# Patient Record
Sex: MALE | Race: WHITE | NOT HISPANIC OR LATINO | Employment: OTHER | URBAN - METROPOLITAN AREA
[De-identification: names, ages, dates, MRNs, and addresses within clinical notes are randomized per-mention and may not be internally consistent; named-entity substitution may affect disease eponyms.]

---

## 2017-01-01 ENCOUNTER — NURSE TRIAGE (OUTPATIENT)
Dept: ADMINISTRATIVE | Facility: CLINIC | Age: 37
End: 2017-01-01

## 2017-01-01 ENCOUNTER — HOSPITAL ENCOUNTER (EMERGENCY)
Facility: HOSPITAL | Age: 37
Discharge: HOME | End: 2017-04-27
Attending: EMERGENCY MEDICINE
Payer: MEDICARE

## 2017-01-01 ENCOUNTER — HOSPITAL ENCOUNTER (EMERGENCY)
Facility: HOSPITAL | Age: 37
Discharge: HOME | End: 2017-06-11
Attending: FAMILY MEDICINE
Payer: MEDICARE

## 2017-01-01 ENCOUNTER — HOSPITAL ENCOUNTER (EMERGENCY)
Facility: HOSPITAL | Age: 37
End: 2017-12-03
Attending: EMERGENCY MEDICINE
Payer: MEDICARE

## 2017-01-01 VITALS
RESPIRATION RATE: 20 BRPM | DIASTOLIC BLOOD PRESSURE: 93 MMHG | HEART RATE: 95 BPM | SYSTOLIC BLOOD PRESSURE: 161 MMHG | TEMPERATURE: 99 F | WEIGHT: 163 LBS | BODY MASS INDEX: 22.11 KG/M2

## 2017-01-01 VITALS
TEMPERATURE: 99 F | RESPIRATION RATE: 17 BRPM | DIASTOLIC BLOOD PRESSURE: 73 MMHG | WEIGHT: 183 LBS | HEART RATE: 76 BPM | OXYGEN SATURATION: 100 % | BODY MASS INDEX: 24.79 KG/M2 | SYSTOLIC BLOOD PRESSURE: 143 MMHG | HEIGHT: 72 IN

## 2017-01-01 DIAGNOSIS — B19.10: ICD-10-CM
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
## 2017-01-01 DIAGNOSIS — I10 ESSENTIAL HYPERTENSION: ICD-10-CM

## 2017-01-01 DIAGNOSIS — R10.11 RUQ ABDOMINAL PAIN: Primary | ICD-10-CM

## 2017-01-01 DIAGNOSIS — S20.211A RIB CONTUSION, RIGHT, INITIAL ENCOUNTER: ICD-10-CM

## 2017-01-01 DIAGNOSIS — I46.9 CARDIAC ARREST: Primary | ICD-10-CM

## 2017-01-01 DIAGNOSIS — R07.81 RIB PAIN ON RIGHT SIDE: ICD-10-CM

## 2017-01-01 LAB — POCT GLUCOSE: 220 MG/DL (ref 70–110)

## 2017-01-01 PROCEDURE — 25000003 PHARM REV CODE 250: Performed by: EMERGENCY MEDICINE

## 2017-01-01 PROCEDURE — 25000003 PHARM REV CODE 250: Performed by: PHYSICIAN ASSISTANT

## 2017-01-01 PROCEDURE — 92950 HEART/LUNG RESUSCITATION CPR: CPT

## 2017-01-01 PROCEDURE — 99283 EMERGENCY DEPT VISIT LOW MDM: CPT

## 2017-01-01 PROCEDURE — 82962 GLUCOSE BLOOD TEST: CPT

## 2017-01-01 PROCEDURE — 31500 INSERT EMERGENCY AIRWAY: CPT

## 2017-01-01 PROCEDURE — 63600175 PHARM REV CODE 636 W HCPCS: Performed by: EMERGENCY MEDICINE

## 2017-01-01 PROCEDURE — 99285 EMERGENCY DEPT VISIT HI MDM: CPT | Mod: 25

## 2017-01-01 RX ORDER — EPINEPHRINE 1 MG/ML
INJECTION INTRAMUSCULAR; INTRAVENOUS; SUBCUTANEOUS CODE/TRAUMA/SEDATION MEDICATION
Status: COMPLETED | OUTPATIENT
Start: 2017-01-01 | End: 2017-01-01

## 2017-01-01 RX ORDER — DEXTROSE MONOHYDRATE 25 G/50ML
INJECTION, SOLUTION INTRAVENOUS
Status: COMPLETED | OUTPATIENT
Start: 2017-01-01 | End: 2017-01-01

## 2017-01-01 RX ORDER — HYDROCODONE BITARTRATE AND ACETAMINOPHEN 10; 325 MG/1; MG/1
TABLET ORAL
COMMUNITY
End: 2017-01-01 | Stop reason: ALTCHOICE

## 2017-01-01 RX ORDER — ZOLPIDEM TARTRATE 10 MG/1
5 TABLET ORAL NIGHTLY PRN
COMMUNITY

## 2017-01-01 RX ORDER — TRAMADOL HYDROCHLORIDE 50 MG/1
50 TABLET ORAL
Status: COMPLETED | OUTPATIENT
Start: 2017-01-01 | End: 2017-01-01

## 2017-01-01 RX ORDER — CLONAZEPAM 0.5 MG/1
0.5 TABLET ORAL 2 TIMES DAILY PRN
COMMUNITY

## 2017-01-01 RX ORDER — HYDROCODONE BITARTRATE AND ACETAMINOPHEN 7.5; 325 MG/1; MG/1
1 TABLET ORAL EVERY 6 HOURS PRN
Qty: 20 TABLET | Refills: 0 | Status: SHIPPED | OUTPATIENT
Start: 2017-01-01

## 2017-01-01 RX ORDER — NALOXONE HYDROCHLORIDE 1 MG/ML
INJECTION INTRAMUSCULAR; INTRAVENOUS; SUBCUTANEOUS CODE/TRAUMA/SEDATION MEDICATION
Status: COMPLETED | OUTPATIENT
Start: 2017-01-01 | End: 2017-01-01

## 2017-01-01 RX ORDER — TRAMADOL HYDROCHLORIDE 50 MG/1
50 TABLET ORAL EVERY 6 HOURS PRN
Qty: 12 TABLET | Refills: 0 | Status: SHIPPED | OUTPATIENT
Start: 2017-01-01

## 2017-01-01 RX ADMIN — TRAMADOL HYDROCHLORIDE 50 MG: 50 TABLET, FILM COATED ORAL at 08:06

## 2017-01-01 RX ADMIN — EPINEPHRINE 1 MG: 1 INJECTION, SOLUTION INTRAMUSCULAR; INTRAVENOUS; SUBCUTANEOUS at 04:12

## 2017-01-01 RX ADMIN — NALOXONE HYDROCHLORIDE 0.4 MG: 1 INJECTION PARENTERAL at 04:12

## 2017-01-01 RX ADMIN — DEXTROSE MONOHYDRATE 25 G: 25 INJECTION, SOLUTION INTRAVENOUS at 04:12

## 2017-04-27 NOTE — ED AVS SNAPSHOT
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    Why:  For further care       These Medications        Disp Refills Start End    hydrocodone-acetaminophen 7.5-325mg (NORCO) 7.5-325 mg per tablet 20 tablet 0 2017     Take 1 tablet by mouth every 6 (six) hours as needed. - Oral      [facility]     [facility] Care [facility] -  Assistance  Unless otherwise directed by your provider, please contact [facility], our nurse care line that is available for  assistance.     Registered nurses in the [facility] provide: appointment scheduling, clinical advisement, health education, and other advisory services.  Call: [phone] (toll free)               Medications           Message regarding Medications     Verify the changes and/or additions to your medication regime listed below are the same as discussed with your clinician today.  If any of these changes or additions are incorrect, please notify your healthcare provider.        START taking these NEW medications        Refills    hydrocodone-acetaminophen 7.5-325mg (NORCO) 7.5-325 mg per tablet 0    Sig: Take 1 tablet by mouth every 6 (six) hours as needed.    Class: Print    Route: Oral      STOP taking these medications     hydrocodone-acetaminophen 10-325mg (NORCO)  mg Tab Take by mouth.            Verify that the below list of medications is an accurate representation of the medications you are currently taking.  If none reported, the list may be blank. If incorrect, please contact your healthcare provider. Carry this list with you in case of emergency.           Current Medications     clonazePAM (KLONOPIN) 0.5 MG tablet Take 0.5 mg by mouth 2 (two) times daily as needed for Anxiety.    UNKNOWN TO PATIENT     zolpidem (AMBIEN) 10 mg Tab Take 5 mg by mouth nightly as needed.    hydrocodone-acetaminophen 7.5-325mg (NORCO) 7.5-325 mg per tablet Take 1 tablet by mouth every 6 (six) hours as needed.           Clinical Reference Information           Your Vitals Were     BP Pulse Temp Resp Height Weight    143/73 (BP Location: Right arm) 82 98.1 °F (36.7 °C) (Oral) 18 6' (1.829 m) 83 kg (183 lb)    SpO2 BMI             98% 24.82 kg/m2         Allergies as of 4/27/2017        Reactions    Morphine Hives      Immunizations Administered on Date of Encounter - 4/27/2017     None      ED Micro, Lab, POCT     None      ED Imaging Orders     None        Discharge Instructions         Medicine for Pain  Medicines can help to block pain, decrease inflammation, and treat related problems. More than one medicine may be used to treat your pain. Medicines may be changed as you feel better, or if they cause side effects.  Medicines What they do Possible side effects   Non-opioid NSAIDs, aspirin, acetaminophen Reduce pain chemicals at the site of pain. NSAIDs can reduce joint and soft tissue inflammation. Nausea, stomach pain, ulcers, indigestion, bleeding, kidney, or liver problems. Certain NSAIDs may increase cardiovascular risk in some patients. Talk with your health care provider.   Opioids (morphine and similar medicines often called narcotics) Reduce feelings or perception of pain. Used for moderate to severe pain. Nausea, vomiting, itching, drowsiness, constipation, slowed breathing.   Other medicines (corticosteroids,  antinausea, antidepressant, and antiseizure medicines) Reduce swelling, burning or tingling pain or limit certain side effects of pain medicines, like nausea or vomiting. Your health care provider will explain the possible side effects of these medicines.   Anesthetics (local, injected) include lidocaine, benzocaine, and medicines used by anesthesiologists Stop pain signals from reaching the brain by blocking feeling in the treated area. Nausea, low blood pressure, fever, slowed breathing, fainting, seizures, heart attack.   When to call the healthcare provider  Call your healthcare provider right away (or have a family member call) if you have:  · Unrelieved pain  · Side effects, including constipation or uncontrolled nausea, that interfere with daily activities  If you have extreme sleepiness or breathing problems, call 911.   Date Last Reviewed: 6/10/2015  © 5834-2793 [employer]. [address] [place] [address]. All rights reserved. This information is not intended as a substitute for professional medical care. Always follow your healthcare professional's instructions.          [facility] Sign-Up     Activating your [facility] account is as easy as 1-2-3!     1) Visit [email or web address], select Sign Up Now, enter this activation code and your date of birth, then select Next.  [email or web address]  Expires: 6/11/2017  8:17 PM      2) Create a username and password to use when you visit [facility] in the future and select a security question in case you lose your password and select Next.    3) Enter your e-mail address and click Sign Up!    Additional Information  If you have questions, please e-mail [email or web address] or call [phone] to talk to our [facility] staff. Remember, [facility] is NOT to be used for urgent needs. For medical emergencies, dial 911.          [facility] complies with applicable Federal civil rights laws and does not discriminate on the basis of race,  color, national origin, age, disability, or sex.        Language Assistance Services     ATTENTION: Language assistance services are available, free of charge. Please call [phone].      ATENCIÓN: Si habla raul, tiene a moya disposición servicios gratuitos de asistencia lingüística. Llame al [phone].     CHÚ Ý: N?u b?n nói Ti?ng Vi?t, có các d?ch v? h? tr? ngôn ng? mi?n phí dành cho b?n. G?i s? [phone].

## 2017-04-28 NOTE — DISCHARGE INSTRUCTIONS
Medicine for Pain  Medicines can help to block pain, decrease inflammation, and treat related problems. More than one medicine may be used to treat your pain. Medicines may be changed as you feel better, or if they cause side effects.  Medicines What they do Possible side effects   Non-opioid NSAIDs, aspirin, acetaminophen Reduce pain chemicals at the site of pain. NSAIDs can reduce joint and soft tissue inflammation. Nausea, stomach pain, ulcers, indigestion, bleeding, kidney, or liver problems. Certain NSAIDs may increase cardiovascular risk in some patients. Talk with your health care provider.   Opioids (morphine and similar medicines often called narcotics) Reduce feelings or perception of pain. Used for moderate to severe pain. Nausea, vomiting, itching, drowsiness, constipation, slowed breathing.   Other medicines (corticosteroids, antinausea, antidepressant, and antiseizure medicines) Reduce swelling, burning or tingling pain or limit certain side effects of pain medicines, like nausea or vomiting. Your health care provider will explain the possible side effects of these medicines.   Anesthetics (local, injected) include lidocaine, benzocaine, and medicines used by anesthesiologists Stop pain signals from reaching the brain by blocking feeling in the treated area. Nausea, low blood pressure, fever, slowed breathing, fainting, seizures, heart attack.   When to call the healthcare provider  Call your healthcare provider right away (or have a family member call) if you have:  · Unrelieved pain  · Side effects, including constipation or uncontrolled nausea, that interfere with daily activities  If you have extreme sleepiness or breathing problems, call 911.   Date Last Reviewed: 6/10/2015  © 9714-7685 [employer]. [address] [place] [address], [place] [address]. All rights reserved. This information is not intended as a substitute for professional medical care. Always follow your healthcare  professional's instructions.

## 2017-04-28 NOTE — ED PROVIDER NOTES
"Encounter Date: 4/27/2017       History     Chief Complaint   Patient presents with    Abdominal Pain     to right side. hx of hep c-states went to PCP last week on 4/19 for blood results since. has apt with "liver doc" tomorrow.      Review of patient's allergies indicates:   Allergen Reactions    Morphine Hives     HPI Comments: Patient has a history of hepatitis C.  He is now having intermittent right upper quadrant pain.  He denies nausea vomiting diarrhea constipation.  Patient has an appointment tomorrow with a gastroenterologist.  Patient had lab work done today which appeared to be normal    Patient is a 36 y.o. male presenting with the following complaint: abdominal pain. The history is provided by the patient.   Abdominal Pain   The current episode started several days ago. The onset of the illness was gradual. The problem has not changed since onset.The abdominal pain is located in the RUQ. The abdominal pain does not radiate. The severity of the abdominal pain is 5/10. The abdominal pain is relieved by nothing. The other symptoms of the illness do not include fever, jaundice, nausea, vomiting, diarrhea, hematemesis or hematochezia.   The patient has not had a change in bowel habit. Additional symptoms associated with the illness include back pain. Symptoms associated with the illness do not include chills, anorexia, diaphoresis or constipation. Significant associated medical issues include substance abuse.     Past Medical History:   Diagnosis Date    Anxiety     Hepatitis C      History reviewed. No pertinent surgical history.  History reviewed. No pertinent family history.  Social History   Substance Use Topics    Smoking status: Never Smoker    Smokeless tobacco: None    Alcohol use Yes      Comment: socially     Review of Systems   Constitutional: Negative for chills, diaphoresis and fever.   Gastrointestinal: Positive for abdominal pain. Negative for anorexia, constipation, diarrhea, " hematemesis, hematochezia, jaundice, nausea and vomiting.   Musculoskeletal: Positive for back pain.   All other systems reviewed and are negative.      Physical Exam   Initial Vitals   BP Pulse Resp Temp SpO2   04/27/17 1857 04/27/17 1857 04/27/17 1857 04/27/17 1857 04/27/17 1857   143/73 82 18 98.1 °F (36.7 °C) 98 %     Physical Exam    Nursing note and vitals reviewed.  Constitutional: He appears well-developed and well-nourished.   HENT:   Head: Normocephalic and atraumatic.   Eyes: EOM are normal.   Neck: Normal range of motion. Neck supple.   Cardiovascular: Normal rate, regular rhythm, normal heart sounds and intact distal pulses.   Pulmonary/Chest: Breath sounds normal.   Abdominal: Soft. There is no tenderness. There is no rigidity, no rebound, no guarding and no CVA tenderness.   Musculoskeletal: Normal range of motion.   Neurological: He is alert and oriented to person, place, and time.   Skin: Skin is warm and dry.   Psychiatric: He has a normal mood and affect. His behavior is normal. Judgment and thought content normal.         ED Course   Procedures  Labs Reviewed - No data to display                            ED Course     Clinical Impression:   The primary encounter diagnosis was RUQ abdominal pain. 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    Disposition:   Disposition: Discharged  Condition: Stable       Suma Wild MD  04/27/17 2017

## 2017-05-09 NOTE — TELEPHONE ENCOUNTER
"Recommended ED now to Calvin for significant abdominal swelling (right abdomen, from his rib down to his hip, he said), pain 9-10 of 10, and worsening jaundice, he said ("my eyes are real yellow").  Message to Dr Tomi Dickerson, who he states is his gastroenterologist in [place].  He says he no longer sees Dr Kendrick, who is listed as his PCP.  Of note, he states he is going to [facility] ED. Please contact caller directly with any additional care advice.      Reason for Disposition   [1] SEVERE pain (e.g., excruciating) AND [2] present > 1 hour    Answer Assessment - Initial Assessment Questions  1. LOCATION: "Where does it hurt?"       My whole right side from my ribs to my hips, it is swollen and very painful.    2. RADIATION: "Does the pain shoot anywhere else?" (e.g., chest, back)      No.    3. ONSET: "When did the pain begin?" (Minutes, hours or days ago)       Began two months ago, and my eyes are real yellow now.    4. SUDDEN: "Gradual or sudden onset?"      Sudden onset.    5. PATTERN "Does the pain come and go, or is it constant?"     - If constant: "Is it getting better, staying the same, or worsening?"       (Note: Constant means the pain never goes away completely; most serious pain is constant and it progresses)      - If intermittent: "How long does it last?" "Do you have pain now?"      (Note: Intermittent means the pain goes away completely between bouts)      It is constant.    6. SEVERITY: "How bad is the pain?"  (e.g., Scale 1-10; mild, moderate, or severe)     - MILD (1-3): doesn't interfere with normal activities, abdomen soft and not tender to touch      - MODERATE (4-7): interferes with normal activities or awakens from sleep, tender to touch      - SEVERE (8-10): excruciating pain, doubled over, unable to do any normal activities        9-10 of 10.    7. RECURRENT SYMPTOM: "Have you ever had this type of abdominal pain before?" If so, ask: "When was the last time?" and "What happened that time?" " "      Never.    8. CAUSE: "What do you think is causing the abdominal pain?"      My liver, Hepatitis C.  My liver is swollen so bad it pushes against my ribs.    9. RELIEVING/AGGRAVATING FACTORS: "What makes it better or worse?" (e.g., movement, antacids, bowel movement)      Nothing makes it better. Every day it is getting worse.    10. OTHER SYMPTOMS: "Has there been any vomiting, diarrhea, constipation, or urine problems?"        Pure foam diarrhea has occurred 5-6 times since I was in the ED a couple of weeks ago.  Nausea, but no vomiting.    Protocols used: ST ABDOMINAL PAIN - MALE-A-      "
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
## 2017-12-03 NOTE — Clinical Note
Date: 12/3/2017  Patient: Calvin Bazzi  Admitted: No admission date for patient encounter.  Attending Provider: Hernandez Rogers MD  Calvin Bazzi was pronounced dead at 1622 by Dr. Hernandez Rogers MD.     The family was not present.   The [facility] ents PCP (JACQUIE Kendrick MD) was informed at , by Dr. Hernandez Rogers MD.   The Medical Examiner was called at  and the body  released by the investigator.     Medical Records was notified at [facility] was called at The bodya potential dono r candidate.    Clothing/Valuables associated: The body is currently located
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
## 2017-12-03 NOTE — ED PROVIDER NOTES
Encounter Date: 12/3/2017       History     Chief Complaint   Patient presents with    unresponsive     pt to ED via POV with adult female and adult male.  Report by couple that dropped pt off to ED that they did not know pt and that he was dropped off to them unresponsive.  Pt unresponsive.       Patient is a 37-year-old male who was left front of the ER waiting room unresponsive after being found down for unknown period time by friends.  Pt is cyanotic, with no spont resp and no pulse. Pupils fixed and dilated. CPR immediately initiated.  Friends stated that he was about to go to rehab soon and drove away.          Review of patient's allergies indicates:   Allergen Reactions    Fentanyl Anaphylaxis    Morphine Hives and Anaphylaxis     Past Medical History:   Diagnosis Date    Anxiety     Hepatitis C      Past Surgical History:   Procedure Laterality Date    BACK SURGERY      FRACTURE SURGERY      Bilateral Lower extremities     No family history on file.  Social History   Substance Use Topics    Smoking status: Never Smoker    Smokeless tobacco: Not on file    Alcohol use Yes      Comment: socially     Review of Systems   Unable to perform ROS: Patient unresponsive       Physical Exam     Initial Vitals   BP Pulse Resp Temp SpO2   -- -- -- -- --      MAP       --         Physical Exam    Nursing note and vitals reviewed.  Constitutional: He appears distressed.   Eyes:   Fixed and dilated.   Cardiovascular:   asystole   Pulmonary/Chest:   No spont resp effort   Abdominal: Soft.   Neurological:   gcs 3         ED Course   Intubation  Date/Time: 12/3/2017 4:34 PM  Location procedure was performed: [facility]  Performed by: KAYLA DELUNA  Authorized by: KAYLA DELUNA   Pre-operative diagnosis: respiratory failure  Post-operative diagnosis: same  Consent Done: Emergent Situation  Indications: respiratory failure and  hypoxemia  Description of findings: copious airway secretions. ETT  inserted under direct visualization through cords   Intubation method: direct  Patient status: unconscious  Preoxygenation: bag valve mask  Pretreatment medications: none  Sedatives: see MAR for details  Paralytic: none  Laryngoscope size: Mac 4  Tube size: 8.0 mm  Tube type: cuffed  Number of attempts: 1  Ventilation between attempts: BVM  Cricoid pressure: yes  Cords visualized: yes  Post-procedure assessment: chest rise and CO2 detector  Breath sounds: clear and absent over the epigastrium  Cuff inflated: yes  Tube secured with: ETT flores    Critical Care  Date/Time: 12/3/2017 4:35 PM  Performed by: HERNANDEZ DELUNA  Authorized by: HERNANDEZ DELUNA   Direct patient critical care time: 20 minutes  Additional history critical care time: 0 minutes  Ordering / reviewing critical care time: 0 minutes  Consulting other physicians critical care time: 0 minutes  Consult with family critical care time: 0 minutes  Total critical care time (exclusive of procedural time) : 20 minutes  Critical care time was exclusive of separately billable procedures and treating other patients and teaching time.  Critical care was necessary to treat or prevent imminent or life-threatening deterioration of the following conditions: cardiac failure, circulatory failure, CNS failure or compromise, respiratory failure and toxidrome.  Critical care was time spent personally by me on the following activities: examination of patient.        Labs Reviewed - No data to display                       Attending Attestation:             Attending ED Notes:   Pt asystolic on arrival. CPR, Intubated, given epi, narcan, d50, sodium bicarb and continued asytole. Pt .  notified          ED Course      Clinical Impression:   The encounter diagnosis was Cardiac arrest.    Disposition:   Disposition:                         Hernandez Deluna MD  [id number]
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
